# Patient Record
Sex: MALE | Race: WHITE | NOT HISPANIC OR LATINO | URBAN - METROPOLITAN AREA
[De-identification: names, ages, dates, MRNs, and addresses within clinical notes are randomized per-mention and may not be internally consistent; named-entity substitution may affect disease eponyms.]

---

## 2018-01-21 ENCOUNTER — EMERGENCY (EMERGENCY)
Facility: HOSPITAL | Age: 53
LOS: 1 days | Discharge: HOME | End: 2018-01-21
Attending: EMERGENCY MEDICINE | Admitting: EMERGENCY MEDICINE
Payer: MEDICAID

## 2018-01-21 VITALS
TEMPERATURE: 99 F | DIASTOLIC BLOOD PRESSURE: 78 MMHG | OXYGEN SATURATION: 100 % | RESPIRATION RATE: 16 BRPM | SYSTOLIC BLOOD PRESSURE: 143 MMHG | HEART RATE: 77 BPM

## 2018-01-21 LAB
APPEARANCE UR: CLEAR — SIGNIFICANT CHANGE UP
BILIRUB UR-MCNC: NEGATIVE — SIGNIFICANT CHANGE UP
COLOR SPEC: SIGNIFICANT CHANGE UP
DIFF PNL FLD: NEGATIVE — SIGNIFICANT CHANGE UP
GLUCOSE UR QL: NEGATIVE — SIGNIFICANT CHANGE UP
KETONES UR-MCNC: NEGATIVE — SIGNIFICANT CHANGE UP
LEUKOCYTE ESTERASE UR-ACNC: NEGATIVE — SIGNIFICANT CHANGE UP
NITRITE UR-MCNC: NEGATIVE — SIGNIFICANT CHANGE UP
PH UR: 6.5 — SIGNIFICANT CHANGE UP (ref 5–8)
PROT UR-MCNC: NEGATIVE — SIGNIFICANT CHANGE UP
SP GR SPEC: 1.02 — SIGNIFICANT CHANGE UP (ref 1.01–1.02)
UROBILINOGEN FLD QL: NEGATIVE — SIGNIFICANT CHANGE UP

## 2018-01-21 PROCEDURE — 87086 URINE CULTURE/COLONY COUNT: CPT

## 2018-01-21 PROCEDURE — 93975 VASCULAR STUDY: CPT | Mod: 26

## 2018-01-21 PROCEDURE — 76870 US EXAM SCROTUM: CPT | Mod: 26

## 2018-01-21 PROCEDURE — 99284 EMERGENCY DEPT VISIT MOD MDM: CPT | Mod: 25

## 2018-01-21 PROCEDURE — 99285 EMERGENCY DEPT VISIT HI MDM: CPT | Mod: 25

## 2018-01-21 PROCEDURE — 81003 URINALYSIS AUTO W/O SCOPE: CPT

## 2018-01-21 PROCEDURE — 93975 VASCULAR STUDY: CPT

## 2018-01-21 PROCEDURE — 76870 US EXAM SCROTUM: CPT

## 2018-01-21 RX ORDER — IBUPROFEN 200 MG
600 TABLET ORAL ONCE
Qty: 0 | Refills: 0 | Status: COMPLETED | OUTPATIENT
Start: 2018-01-21 | End: 2018-01-21

## 2018-01-21 RX ADMIN — Medication 600 MILLIGRAM(S): at 06:00

## 2018-01-21 NOTE — ED ADULT NURSE NOTE - OBJECTIVE STATEMENT
51 yo male complaining of pain on penis and scrotum, for 2 weeks. Pt alerted and oriented x3, no signs of acute distress noted at this moment. Vital sWNL

## 2018-01-21 NOTE — ED PROVIDER NOTE - OBJECTIVE STATEMENT
51yo M pmh hydrocele p/w b/l testicle pain x3wks. had penile pain x1day. had similiary symptoms 2years ago when he had hydrocele & epidydmitis. Denies fever, CP, SOB, Nausea, vomiting, diarrhea, abdominal pain, dysuria, penile dc, multiple sexual partners 51yo M pmh hydrocele p/w achy moderate intensity constant b/l testicle pain x3wks. had penile pain x1day. had similiary symptoms 2years ago when he had hydrocele & epidydmitis. Denies fever, CP, SOB, Nausea, vomiting, diarrhea, abdominal pain, dysuria, penile dc, multiple sexual partners. No exacerbating or relieving symptoms.

## 2018-01-21 NOTE — ED PROVIDER NOTE - MEDICAL DECISION MAKING DETAILS
impression: symptoms likely due to varicocle. will r/o epidymidis   plan: symptomatic tx w/ analgesic, - us -dc home w/ pmd for further w/u impression: symptoms likely due to varicocle. will r/o epidymidis, less likely torsion   plan: symptomatic tx w/ analgesic, - us -dc home w/ pmd for further w/u

## 2018-01-21 NOTE — ED ADULT TRIAGE NOTE - CHIEF COMPLAINT QUOTE
penile and testicular pain, denies any dc, Had the same problem a month ago and was resolved by itself.

## 2018-01-21 NOTE — ED PROVIDER NOTE - PROGRESS NOTE DETAILS
judy: PT seen and reassessed.  Patient symptomatically improved.   AAOX3, NAD, VSS.  Discussed test results w/ patient. Patient verbalized understanding of hospital course and outpatient plans, has decisional making capacity.  Will f/u w/ pmd in the next few days; patient will call for an appointment. Will return to the ED if there is any worsening of symptoms.  Patient able to ambulate at baseline, is tolerating PO intake

## 2018-01-21 NOTE — ED PROVIDER NOTE - ATTENDING CONTRIBUTION TO CARE
ATTENDING MD:  I, Enrico Hamilton, personally have seen and examined this patient.  I have discussed all aspects of care with the resident physician. Resident note reviewed and agree on plan of care and except where noted.  See HPI, PE, and MDM for details.     well appearing male. mildly uncomfortable. heart normal rate, regular rhythm. lungs clear to auscultation bilaterally, equal breath sounds bilaterally. abd soft, nontender nondistended. external genetalia unremarkable, circumcised penis, no discharge, no inguinal LA, no hernias, bilateral varicoceles, testicles nontender, no palpable epididymal vickey, +cremaster bilaterally, negative Prehn's sign bilaterally, no significant tenderness. no CVAT. no rash. skin exam normal for ethnicity.    MDM: chronic testicle pain. possible epididymitis, minimal concern for torsion. benign abdominal exam. will get US, give non-narcotic pain control, US for epididymitis. no obvious source for referred pain, no kidney stone symptoms. likely candiate for outpateint care w/urology f/u.

## 2018-01-22 LAB
C TRACH RRNA SPEC QL NAA+PROBE: SIGNIFICANT CHANGE UP
CULTURE RESULTS: NO GROWTH — SIGNIFICANT CHANGE UP
N GONORRHOEA RRNA SPEC QL NAA+PROBE: SIGNIFICANT CHANGE UP
SPECIMEN SOURCE: SIGNIFICANT CHANGE UP
SPECIMEN SOURCE: SIGNIFICANT CHANGE UP

## 2018-03-12 ENCOUNTER — APPOINTMENT (OUTPATIENT)
Dept: OPHTHALMOLOGY | Facility: CLINIC | Age: 53
End: 2018-03-12
Payer: MEDICAID

## 2018-03-12 PROCEDURE — 92004 COMPRE OPH EXAM NEW PT 1/>: CPT

## 2018-03-12 PROCEDURE — 92015 DETERMINE REFRACTIVE STATE: CPT

## 2018-09-15 ENCOUNTER — EMERGENCY (EMERGENCY)
Facility: HOSPITAL | Age: 53
LOS: 1 days | Discharge: HOME | End: 2018-09-15
Attending: EMERGENCY MEDICINE
Payer: MEDICAID

## 2018-09-15 VITALS
DIASTOLIC BLOOD PRESSURE: 80 MMHG | RESPIRATION RATE: 18 BRPM | HEART RATE: 83 BPM | OXYGEN SATURATION: 98 % | WEIGHT: 145.06 LBS | SYSTOLIC BLOOD PRESSURE: 137 MMHG | HEIGHT: 68 IN

## 2018-09-15 VITALS
HEART RATE: 78 BPM | DIASTOLIC BLOOD PRESSURE: 90 MMHG | TEMPERATURE: 99 F | SYSTOLIC BLOOD PRESSURE: 122 MMHG | RESPIRATION RATE: 16 BRPM | OXYGEN SATURATION: 97 %

## 2018-09-15 LAB
ALBUMIN SERPL ELPH-MCNC: 4.5 G/DL — SIGNIFICANT CHANGE UP (ref 3.3–5)
ALP SERPL-CCNC: 74 U/L — SIGNIFICANT CHANGE UP (ref 40–120)
ALT FLD-CCNC: 21 U/L — SIGNIFICANT CHANGE UP (ref 10–45)
ANION GAP SERPL CALC-SCNC: 10 MMOL/L — SIGNIFICANT CHANGE UP (ref 5–17)
AST SERPL-CCNC: 21 U/L — SIGNIFICANT CHANGE UP (ref 10–40)
BASOPHILS # BLD AUTO: 0.1 K/UL — SIGNIFICANT CHANGE UP (ref 0–0.2)
BASOPHILS NFR BLD AUTO: 1.3 % — SIGNIFICANT CHANGE UP (ref 0–2)
BILIRUB SERPL-MCNC: 0.4 MG/DL — SIGNIFICANT CHANGE UP (ref 0.2–1.2)
BUN SERPL-MCNC: 16 MG/DL — SIGNIFICANT CHANGE UP (ref 7–23)
CALCIUM SERPL-MCNC: 9.6 MG/DL — SIGNIFICANT CHANGE UP (ref 8.4–10.5)
CHLORIDE SERPL-SCNC: 101 MMOL/L — SIGNIFICANT CHANGE UP (ref 96–108)
CO2 SERPL-SCNC: 25 MMOL/L — SIGNIFICANT CHANGE UP (ref 22–31)
CREAT SERPL-MCNC: 1.12 MG/DL — SIGNIFICANT CHANGE UP (ref 0.5–1.3)
EOSINOPHIL # BLD AUTO: 0.3 K/UL — SIGNIFICANT CHANGE UP (ref 0–0.5)
EOSINOPHIL NFR BLD AUTO: 3.3 % — SIGNIFICANT CHANGE UP (ref 0–6)
GLUCOSE SERPL-MCNC: 102 MG/DL — HIGH (ref 70–99)
HCT VFR BLD CALC: 44.2 % — SIGNIFICANT CHANGE UP (ref 39–50)
HGB BLD-MCNC: 15 G/DL — SIGNIFICANT CHANGE UP (ref 13–17)
LYMPHOCYTES # BLD AUTO: 2.6 K/UL — SIGNIFICANT CHANGE UP (ref 1–3.3)
LYMPHOCYTES # BLD AUTO: 33.9 % — SIGNIFICANT CHANGE UP (ref 13–44)
MCHC RBC-ENTMCNC: 29.3 PG — SIGNIFICANT CHANGE UP (ref 27–34)
MCHC RBC-ENTMCNC: 34 GM/DL — SIGNIFICANT CHANGE UP (ref 32–36)
MCV RBC AUTO: 86.1 FL — SIGNIFICANT CHANGE UP (ref 80–100)
MONOCYTES # BLD AUTO: 0.6 K/UL — SIGNIFICANT CHANGE UP (ref 0–0.9)
MONOCYTES NFR BLD AUTO: 7.6 % — SIGNIFICANT CHANGE UP (ref 2–14)
NEUTROPHILS # BLD AUTO: 4.1 K/UL — SIGNIFICANT CHANGE UP (ref 1.8–7.4)
NEUTROPHILS NFR BLD AUTO: 53.9 % — SIGNIFICANT CHANGE UP (ref 43–77)
PLATELET # BLD AUTO: 259 K/UL — SIGNIFICANT CHANGE UP (ref 150–400)
POTASSIUM SERPL-MCNC: 4.3 MMOL/L — SIGNIFICANT CHANGE UP (ref 3.5–5.3)
POTASSIUM SERPL-SCNC: 4.3 MMOL/L — SIGNIFICANT CHANGE UP (ref 3.5–5.3)
PROT SERPL-MCNC: 7.3 G/DL — SIGNIFICANT CHANGE UP (ref 6–8.3)
RBC # BLD: 5.13 M/UL — SIGNIFICANT CHANGE UP (ref 4.2–5.8)
RBC # FLD: 12.8 % — SIGNIFICANT CHANGE UP (ref 10.3–14.5)
SODIUM SERPL-SCNC: 136 MMOL/L — SIGNIFICANT CHANGE UP (ref 135–145)
TROPONIN T, HIGH SENSITIVITY RESULT: <6 NG/L — SIGNIFICANT CHANGE UP (ref 0–51)
WBC # BLD: 7.6 K/UL — SIGNIFICANT CHANGE UP (ref 3.8–10.5)
WBC # FLD AUTO: 7.6 K/UL — SIGNIFICANT CHANGE UP (ref 3.8–10.5)

## 2018-09-15 PROCEDURE — 93005 ELECTROCARDIOGRAM TRACING: CPT

## 2018-09-15 PROCEDURE — 85027 COMPLETE CBC AUTOMATED: CPT

## 2018-09-15 PROCEDURE — 93010 ELECTROCARDIOGRAM REPORT: CPT

## 2018-09-15 PROCEDURE — 80053 COMPREHEN METABOLIC PANEL: CPT

## 2018-09-15 PROCEDURE — 99283 EMERGENCY DEPT VISIT LOW MDM: CPT

## 2018-09-15 PROCEDURE — 84484 ASSAY OF TROPONIN QUANT: CPT

## 2018-09-15 PROCEDURE — 99284 EMERGENCY DEPT VISIT MOD MDM: CPT | Mod: 25

## 2018-09-15 RX ORDER — CARBAMIDE PEROXIDE 81.86 MG/ML
4 SOLUTION/ DROPS AURICULAR (OTIC)
Qty: 0 | Refills: 0 | Status: DISCONTINUED | OUTPATIENT
Start: 2018-09-15 | End: 2018-09-19

## 2018-09-15 RX ORDER — MECLIZINE HCL 12.5 MG
25 TABLET ORAL ONCE
Qty: 0 | Refills: 0 | Status: COMPLETED | OUTPATIENT
Start: 2018-09-15 | End: 2018-09-15

## 2018-09-15 RX ORDER — MECLIZINE HCL 12.5 MG
1 TABLET ORAL
Qty: 21 | Refills: 0 | OUTPATIENT
Start: 2018-09-15 | End: 2018-09-21

## 2018-09-15 RX ADMIN — CARBAMIDE PEROXIDE 4 DROP(S): 81.86 SOLUTION/ DROPS AURICULAR (OTIC) at 13:54

## 2018-09-15 RX ADMIN — Medication 25 MILLIGRAM(S): at 11:38

## 2018-09-15 NOTE — ED PROVIDER NOTE - ATTENDING CONTRIBUTION TO CARE
54 yo male with multiple episode of vertiginous feeling over the past 3-4 weeks.  Last night also had some transient chest pressure.  Well appearing on my exam.  Will medicate, check labs/HST, reassess.  Very low suspicion for cardiac etiology, not CVA, suspect BPV.

## 2018-09-15 NOTE — ED ADULT TRIAGE NOTE - CHIEF COMPLAINT QUOTE
pt c/o dizziness, palpitations, sob, generalized weakness for 4 weeks pt c/o dizziness, palpitations, sob, generalized weakness for 4 weeks, pt went on vacation even though he wasn't feeling sick

## 2018-09-15 NOTE — ED ADULT NURSE NOTE - OBJECTIVE STATEMENT
53v yrs old male with no medical history present to the ER for dizziness, chest palpitations and SOB x3 months. As per pt he did not seek medical treatment for symptoms. Pt report that he is concern because he is planning on travelling outside the country. Pt report the symptoms are constant and that movements make it worse.

## 2018-09-15 NOTE — ED PROVIDER NOTE - PHYSICAL EXAMINATION
GENERAL: no acute distress; well-developed  HEAD:  Atraumatic, Normocephalic  EYES: EOMI PERRLA, conjunctiva and sclera clear. No nystagmus.   ENT: MMM; oropharynx clear  NECK: Supple, No JVD  CHEST/LUNG: Clear to auscultation bilaterally; No wheeze  HEART: Regular rate and rhythm; No murmurs, rubs, or gallops  ABDOMEN: Soft, Nontender, Nondistended; Bowel sounds present  EXTREMITIES:  2+ Peripheral Pulses, No clubbing, cyanosis, or edema  PSYCH: AAOx3  NEUROLOGY: no focal motor or sensory deficits. 5/5 muscle strength in all extremities. Negative HINTS. Normal finger to nose. Negative pronator drift.   SKIN: No rashes or lesions GENERAL: no acute distress; well-developed  HEAD:  Atraumatic, Normocephalic  EYES: EOMI PERRLA, conjunctiva and sclera clear. No nystagmus.   ENT: MMM; oropharynx clear; cerumen impaction b/l   NECK: Supple, No JVD  CHEST/LUNG: Clear to auscultation bilaterally; No wheeze  HEART: Regular rate and rhythm; No murmurs, rubs, or gallops  ABDOMEN: Soft, Nontender, Nondistended; Bowel sounds present  EXTREMITIES:  2+ Peripheral Pulses, No clubbing, cyanosis, or edema  PSYCH: AAOx3  NEUROLOGY: no focal motor or sensory deficits. 5/5 muscle strength in all extremities. Negative HINTS. Normal finger to nose. Negative pronator drift.   SKIN: No rashes or lesions

## 2018-09-15 NOTE — ED ADULT NURSE NOTE - NSIMPLEMENTINTERV_GEN_ALL_ED
Implemented All Universal Safety Interventions:  [place] to call system. Call bell, personal items and telephone within reach. Instruct patient to call for assistance. Room bathroom lighting operational. Non-slip footwear when patient is off stretcher. Physically safe environment: no spills, clutter or unnecessary equipment. Stretcher in lowest position, wheels locked, appropriate side rails in place.

## 2018-09-15 NOTE — ED PROVIDER NOTE - CARE PLAN
Principal Discharge DX:	Vertigo  Assessment and plan of treatment:	1. Take meclizine as prescribed  2. Follow up with your primary care doctor within 1 week  3. Return to the emergency room for any worsening chest pain or difficulty breathing.   4. Use Debrox drops as directed

## 2018-09-15 NOTE — ED PROVIDER NOTE - PLAN OF CARE
1. Take meclizine as prescribed  2. Follow up with your primary care doctor within 1 week  3. Return to the emergency room for any worsening chest pain or difficulty breathing.   4. Use Debrox drops as directed

## 2018-09-15 NOTE — ED ADULT NURSE NOTE - CHPI ED NUR SYMPTOMS NEG
no tingling/no chills/no nausea/no fever/no vomiting/no decreased eating/drinking/no weakness/no pain

## 2018-09-15 NOTE — ED PROVIDER NOTE - PROGRESS NOTE DETAILS
Vertigo Improved; patient not experiencing any chest pain. Will discharge with meclizine & PMD follow up.

## 2018-09-15 NOTE — ED PROVIDER NOTE - OBJECTIVE STATEMENT
52 y/o M no pertinent PMH presents with dizziness & nausea x 3-4 weeks.     Patient states last night did not feel well and thus came in today. Has been experiencing dizziness regardless of position; describes as spinning sensation with associated nausea. Denies any ringing in ears or hearing loss but notes fullness in ears. Denies any URI Sx prior to sx. Notes dizziness fluctuates in intensity but rarely subsides completely.     PMD: Dr. Finch 52 y/o M no pertinent PMH presents with dizziness & nausea x 3-4 weeks.     Patient states last night did not feel well and thus came in today. Has been experiencing dizziness regardless of position; describes as spinning sensation with associated nausea. Denies any ringing in ears or hearing loss but notes fullness in ears. Denies any URI Sx prior to sx. Notes dizziness fluctuates in intensity but rarely subsides completely. Noticed chest pressure last night.     PMD: Dr. Finch

## 2018-09-15 NOTE — ED PROVIDER NOTE - MEDICAL DECISION MAKING DETAILS
54 y/o M no significant PMH presents with vertigo. Exam c/w peripheral vertigo (BBPV v. labyrinthitis); low suspicion of central cause. given chronicity and positional nature of sx. Given palpitations & chest pressure will obtain EKG & cardiac enzymes.

## 2018-09-15 NOTE — ED ADULT NURSE NOTE - CHIEF COMPLAINT QUOTE
pt c/o dizziness, palpitations, sob, generalized weakness for 4 weeks, pt went on vacation even though he wasn't feeling sick

## 2019-11-22 ENCOUNTER — EMERGENCY (EMERGENCY)
Facility: HOSPITAL | Age: 54
LOS: 1 days | Discharge: HOME | End: 2019-11-22
Attending: EMERGENCY MEDICINE | Admitting: EMERGENCY MEDICINE
Payer: COMMERCIAL

## 2019-11-22 VITALS
SYSTOLIC BLOOD PRESSURE: 138 MMHG | RESPIRATION RATE: 18 BRPM | DIASTOLIC BLOOD PRESSURE: 67 MMHG | HEART RATE: 95 BPM | TEMPERATURE: 98 F | OXYGEN SATURATION: 100 %

## 2019-11-22 VITALS
OXYGEN SATURATION: 99 % | DIASTOLIC BLOOD PRESSURE: 70 MMHG | HEART RATE: 73 BPM | RESPIRATION RATE: 16 BRPM | SYSTOLIC BLOOD PRESSURE: 108 MMHG | TEMPERATURE: 98 F

## 2019-11-22 LAB
ALBUMIN SERPL ELPH-MCNC: 4.4 G/DL — SIGNIFICANT CHANGE UP (ref 3.3–5)
ALP SERPL-CCNC: 68 U/L — SIGNIFICANT CHANGE UP (ref 40–120)
ALT FLD-CCNC: 27 U/L — SIGNIFICANT CHANGE UP (ref 10–45)
ANION GAP SERPL CALC-SCNC: 12 MMOL/L — SIGNIFICANT CHANGE UP (ref 5–17)
APPEARANCE UR: CLEAR — SIGNIFICANT CHANGE UP
AST SERPL-CCNC: 21 U/L — SIGNIFICANT CHANGE UP (ref 10–40)
BACTERIA # UR AUTO: PRESENT /HPF
BASOPHILS # BLD AUTO: 0.08 K/UL — SIGNIFICANT CHANGE UP (ref 0–0.2)
BASOPHILS NFR BLD AUTO: 0.9 % — SIGNIFICANT CHANGE UP (ref 0–2)
BILIRUB SERPL-MCNC: 0.3 MG/DL — SIGNIFICANT CHANGE UP (ref 0.2–1.2)
BILIRUB UR-MCNC: NEGATIVE — SIGNIFICANT CHANGE UP
BUN SERPL-MCNC: 14 MG/DL — SIGNIFICANT CHANGE UP (ref 7–23)
CALCIUM SERPL-MCNC: 9.3 MG/DL — SIGNIFICANT CHANGE UP (ref 8.4–10.5)
CHLORIDE SERPL-SCNC: 104 MMOL/L — SIGNIFICANT CHANGE UP (ref 96–108)
CO2 SERPL-SCNC: 25 MMOL/L — SIGNIFICANT CHANGE UP (ref 22–31)
COLOR SPEC: YELLOW — SIGNIFICANT CHANGE UP
CREAT SERPL-MCNC: 0.98 MG/DL — SIGNIFICANT CHANGE UP (ref 0.5–1.3)
DIFF PNL FLD: NEGATIVE — SIGNIFICANT CHANGE UP
EOSINOPHIL # BLD AUTO: 0.24 K/UL — SIGNIFICANT CHANGE UP (ref 0–0.5)
EOSINOPHIL NFR BLD AUTO: 2.8 % — SIGNIFICANT CHANGE UP (ref 0–6)
EPI CELLS # UR: SIGNIFICANT CHANGE UP /HPF (ref 0–5)
GLUCOSE SERPL-MCNC: 111 MG/DL — HIGH (ref 70–99)
GLUCOSE UR QL: NEGATIVE — SIGNIFICANT CHANGE UP
HCT VFR BLD CALC: 44.3 % — SIGNIFICANT CHANGE UP (ref 39–50)
HGB BLD-MCNC: 14.6 G/DL — SIGNIFICANT CHANGE UP (ref 13–17)
IMM GRANULOCYTES NFR BLD AUTO: 0.3 % — SIGNIFICANT CHANGE UP (ref 0–1.5)
KETONES UR-MCNC: ABNORMAL MG/DL
LEUKOCYTE ESTERASE UR-ACNC: NEGATIVE — SIGNIFICANT CHANGE UP
LYMPHOCYTES # BLD AUTO: 2.65 K/UL — SIGNIFICANT CHANGE UP (ref 1–3.3)
LYMPHOCYTES # BLD AUTO: 30.6 % — SIGNIFICANT CHANGE UP (ref 13–44)
MCHC RBC-ENTMCNC: 28.1 PG — SIGNIFICANT CHANGE UP (ref 27–34)
MCHC RBC-ENTMCNC: 33 GM/DL — SIGNIFICANT CHANGE UP (ref 32–36)
MCV RBC AUTO: 85.4 FL — SIGNIFICANT CHANGE UP (ref 80–100)
MONOCYTES # BLD AUTO: 0.74 K/UL — SIGNIFICANT CHANGE UP (ref 0–0.9)
MONOCYTES NFR BLD AUTO: 8.5 % — SIGNIFICANT CHANGE UP (ref 2–14)
NEUTROPHILS # BLD AUTO: 4.92 K/UL — SIGNIFICANT CHANGE UP (ref 1.8–7.4)
NEUTROPHILS NFR BLD AUTO: 56.9 % — SIGNIFICANT CHANGE UP (ref 43–77)
NITRITE UR-MCNC: NEGATIVE — SIGNIFICANT CHANGE UP
NRBC # BLD: 0 /100 WBCS — SIGNIFICANT CHANGE UP (ref 0–0)
PH UR: 8 — SIGNIFICANT CHANGE UP (ref 5–8)
PLATELET # BLD AUTO: 288 K/UL — SIGNIFICANT CHANGE UP (ref 150–400)
POTASSIUM SERPL-MCNC: 3.9 MMOL/L — SIGNIFICANT CHANGE UP (ref 3.5–5.3)
POTASSIUM SERPL-SCNC: 3.9 MMOL/L — SIGNIFICANT CHANGE UP (ref 3.5–5.3)
PROT SERPL-MCNC: 7.4 G/DL — SIGNIFICANT CHANGE UP (ref 6–8.3)
PROT UR-MCNC: ABNORMAL MG/DL
RBC # BLD: 5.19 M/UL — SIGNIFICANT CHANGE UP (ref 4.2–5.8)
RBC # FLD: 13.2 % — SIGNIFICANT CHANGE UP (ref 10.3–14.5)
RBC CASTS # UR COMP ASSIST: < 5 /HPF — SIGNIFICANT CHANGE UP
SODIUM SERPL-SCNC: 141 MMOL/L — SIGNIFICANT CHANGE UP (ref 135–145)
SP GR SPEC: 1.02 — SIGNIFICANT CHANGE UP (ref 1–1.03)
TROPONIN T SERPL-MCNC: <0.01 NG/ML — SIGNIFICANT CHANGE UP (ref 0–0.01)
UROBILINOGEN FLD QL: 0.2 E.U./DL — SIGNIFICANT CHANGE UP
WBC # BLD: 8.66 K/UL — SIGNIFICANT CHANGE UP (ref 3.8–10.5)
WBC # FLD AUTO: 8.66 K/UL — SIGNIFICANT CHANGE UP (ref 3.8–10.5)
WBC UR QL: < 5 /HPF — SIGNIFICANT CHANGE UP

## 2019-11-22 PROCEDURE — 84484 ASSAY OF TROPONIN QUANT: CPT

## 2019-11-22 PROCEDURE — 99284 EMERGENCY DEPT VISIT MOD MDM: CPT | Mod: 25

## 2019-11-22 PROCEDURE — 93010 ELECTROCARDIOGRAM REPORT: CPT

## 2019-11-22 PROCEDURE — 85025 COMPLETE CBC W/AUTO DIFF WBC: CPT

## 2019-11-22 PROCEDURE — 93005 ELECTROCARDIOGRAM TRACING: CPT

## 2019-11-22 PROCEDURE — 80053 COMPREHEN METABOLIC PANEL: CPT

## 2019-11-22 PROCEDURE — 71046 X-RAY EXAM CHEST 2 VIEWS: CPT | Mod: 26

## 2019-11-22 PROCEDURE — 81001 URINALYSIS AUTO W/SCOPE: CPT

## 2019-11-22 PROCEDURE — 99285 EMERGENCY DEPT VISIT HI MDM: CPT

## 2019-11-22 PROCEDURE — 70450 CT HEAD/BRAIN W/O DYE: CPT

## 2019-11-22 PROCEDURE — 71046 X-RAY EXAM CHEST 2 VIEWS: CPT

## 2019-11-22 PROCEDURE — 36415 COLL VENOUS BLD VENIPUNCTURE: CPT

## 2019-11-22 PROCEDURE — 70450 CT HEAD/BRAIN W/O DYE: CPT | Mod: 26

## 2019-11-22 RX ORDER — SODIUM CHLORIDE 9 MG/ML
1000 INJECTION INTRAMUSCULAR; INTRAVENOUS; SUBCUTANEOUS ONCE
Refills: 0 | Status: COMPLETED | OUTPATIENT
Start: 2019-11-22 | End: 2019-11-22

## 2019-11-22 RX ADMIN — SODIUM CHLORIDE 1000 MILLILITER(S): 9 INJECTION INTRAMUSCULAR; INTRAVENOUS; SUBCUTANEOUS at 10:08

## 2019-11-22 NOTE — ED PROVIDER NOTE - OBJECTIVE STATEMENT
55 y/o male with no sig PMHx c/o near syncope. pt states [place] the train and felt like he was going to pass out. pt notes became dizzy with chest tightness followed by tingling all over. similar symptoms over past 2 months. pt denies LOC. no ha or visual changes. no weakness. no sob, lloyd, abd pain, n/v. no further complaints.

## 2019-11-22 NOTE — ED ADULT TRIAGE NOTE - OTHER COMPLAINTS
pt reports sitting on the train, sudden onset numbness all over, difficulty catching his breath. denies pmhx. pt admits to having several similar episodes that have subsided on their own over the last few weeks. pt appears anxious

## 2019-11-22 NOTE — ED PROVIDER NOTE - ATTENDING CONTRIBUTION TO CARE
54M no PMH p/w several complaints[place] feeling generalized weakness, followed by head spinning, heart beating fast, tingling all over body, feeling cold/shivering, breathing fast. Symptoms lasted ~1hr and now completely resolved. Has similar episodes ~once a week for several mos. First time getting evaluated. Pt was feeling like usual self prior to events and feels like usual self now. Denies diaphoresis, NVD, abd pain, URI symptoms, urinary complaints, black/bloody stool, focal weakness/numbness, recent travel/immobilization. No actual LOC. No FMH CAD/clots/sudden death. Vitals wnl. Exam as above.   In ED, received IVF. Labs, CTH, CXR all grossly wnl. Remained asymptoamtic.   Unclear etiology. clinically not ACS, PE, tamponade, dissection, PTX, perf, myocarditis, mediastinitis, CVA. Clinically no indication for further emergent ED workup or hospitalization at this time. discussed importance of outpt f/u. Comfortable for dc.

## 2019-11-22 NOTE — ED PROVIDER NOTE - PATIENT PORTAL LINK FT
You can access the FollowMyHealth Patient Portal offered by [facility] by registering at the following website: [email or web address]. By joining [employer]’s FollowMyHealth portal, you will also be able to view your health information using other applications (apps) compatible with our system.

## 2019-11-22 NOTE — ED PROVIDER NOTE - CLINICAL SUMMARY MEDICAL DECISION MAKING FREE TEXT BOX
dizziness, chest tightness, tingling, and near syncope. pt well appearing. neuro exam intact. VSS. sx's resolved in ED by fluid. ecg no ischemic changes, ct head no acute pathology, cxr no acute pathology. labs noted. troponin negative. will d/c home to f/u with cardiology. return precautions d/w pt.

## 2019-11-22 NOTE — ED PROVIDER NOTE - CARE PROVIDER_API CALL
Kumar Fragoso)  Cardiovascular Disease; Internal Medicine  [address] [id number]  Phone: [phone]  Fax: [phone]  Follow Up Time: 7-10 Days

## 2019-11-22 NOTE — ED ADULT NURSE NOTE - OBJECTIVE STATEMENT
Pt is a 53 y/o male c/o intermittent episodes of chest tightness, dizziness, SOB, nausea and "tingling all over" x 2 months. Pt reports feeling increase stress and anxiety over last few months. Upon assessment pt anxious but cooperative. LCTA. Pt denies leg swelling/pain, chest pain, any medical hx. Pt is a 53 y/o male c/o intermittent episodes of chest tightness, dizziness, SOB, nausea and "tingling all over" x 2 months. Pt reports feeling increase stress and anxiety over last few months. Upon assessment pt anxious but cooperative. LCTA. Pt denies leg swelling/pain, chest pain, any medical hx, SI/HI.

## 2019-11-22 NOTE — ED PROVIDER NOTE - NSFOLLOWUPINSTRUCTIONS_ED_ALL_ED_FT
Follow up with cardiology in one week and follow up with your primary care physician within one week.        Near Syncope    WHAT YOU NEED TO KNOW:    Near syncope, also called presyncope, is the feeling that you may faint (lose consciousness), but you do not. You can control some health conditions that cause near syncope. Your healthcare providers can help you create a plan to manage near syncope and prevent episodes.    DISCHARGE INSTRUCTIONS:    Return to the emergency department if:     You have sudden chest pain.       You have trouble breathing or shortness of breath.       You have vision changes, are sweating, and have nausea while you are sitting or lying down.       You feel dizzy or flushed and your heart is fluttering.      You lose consciousness.      You cannot use your arm, hand, foot, or leg, or it feels weak.      You have trouble speaking or understanding others when they speak.    Contact your healthcare provider if:     You have new or worsening symptoms.      Your heart beats faster or slower than usual.       You have questions or concerns about your condition or care.    Medicines:     Medicines may be needed to help your heart pump strongly and regularly. Your healthcare provider may also make changes to any medicines that are causing syncope.       Take your medicine as directed. Contact your healthcare provider if you think your medicine is not helping or if you have side effects. Tell him or her if you are allergic to any medicine. Keep a list of the medicines, vitamins, and herbs you take. Include the amounts, and when and why you take them. Bring the list or the pill bottles to follow-up visits. Carry your medicine list with you in case of an emergency.    Follow up with your healthcare provider as directed: You may need more tests to help find the cause of your near syncope episodes. The tests will help healthcare providers plan the best treatment for you. Write down your questions so you remember to ask them during your visits.     Manage near syncope:     Sit or lie down when needed. This includes when you feel dizzy, your throat is getting tight, and your vision changes. Raise your legs above the level of your heart.      Take slow, deep breaths if you start to breathe faster with anxiety or fear. This can help decrease dizziness and the feeling that you might faint.       Keep a record of your near syncope episodes. Include your symptoms and your activity before and after the episode. The record can help your healthcare provider find the cause of your near syncope and help you manage episodes.    Prevent a near syncope episode:     Move slowly and let yourself get used to one position before you move to another position. This is very important when you change from a lying or sitting position to a standing position. Take some deep breaths before you stand up from a lying position. Stand up slowly. Sudden movements may cause a fainting spell. Sit on the side of the bed or couch for a few minutes before you stand up. If you are on bedrest, try to be upright for about 2 hours each day, or as directed. Do not lock your legs if you are standing for a long period of time. Move your legs and bend your knees to keep blood flowing.      Follow your healthcare provider's recommendations. Your provider may recommend that you drink more liquids to prevent dehydration. You may also need to have more salt to keep your blood pressure from dropping too low and causing syncope. Your provider will tell you how much liquid and sodium to have each day. He or she will also tell you how much physical activity is safe for you. This will depend on what is causing your near syncope.      Watch for signs of low blood sugar. These include hunger, nervousness, sweating, and fast or fluttery heartbeats. Talk with your healthcare provider about ways to keep your blood sugar level steady.      Check your blood pressure often. This is important if you take medicine to lower your blood pressure. Check your blood pressure when you are lying down and when you are standing. Ask how often to check during the day. Keep a record of your blood pressure numbers. Your healthcare provider may use the record to help plan your treatment.How to take a Blood Pressure           Do not strain if you are constipated. You may faint if you strain to have a bowel movement. Walking is the best way to get your bowels moving. Eat foods high in fiber to make it easier to have a bowel movement. Good examples are high-fiber cereals, beans, vegetables, and whole-grain breads. Prune juice may help make bowel movements softer.      Do not exercise outside on a hot day. The combination of physical activity and heat can lead to dehydration. This can cause syncope.         © Copyright [employer] 2019       back to top                      © Copyright [employer] 2019

## 2019-11-25 ENCOUNTER — APPOINTMENT (OUTPATIENT)
Dept: HEART AND VASCULAR | Facility: CLINIC | Age: 54
End: 2019-11-25
Payer: MEDICAID

## 2019-11-25 VITALS
WEIGHT: 148.99 LBS | OXYGEN SATURATION: 99 % | SYSTOLIC BLOOD PRESSURE: 114 MMHG | BODY MASS INDEX: 23.94 KG/M2 | TEMPERATURE: 97.7 F | HEIGHT: 66 IN | HEART RATE: 80 BPM | DIASTOLIC BLOOD PRESSURE: 70 MMHG

## 2019-11-25 DIAGNOSIS — Z82.49 FAMILY HISTORY OF ISCHEMIC HEART DISEASE AND OTHER DISEASES OF THE CIRCULATORY SYSTEM: ICD-10-CM

## 2019-11-25 DIAGNOSIS — Z78.9 OTHER SPECIFIED HEALTH STATUS: ICD-10-CM

## 2019-11-25 PROCEDURE — 99204 OFFICE O/P NEW MOD 45 MIN: CPT

## 2019-11-25 PROCEDURE — 93306 TTE W/DOPPLER COMPLETE: CPT

## 2019-11-25 PROCEDURE — 93000 ELECTROCARDIOGRAM COMPLETE: CPT

## 2019-11-26 ENCOUNTER — APPOINTMENT (OUTPATIENT)
Dept: INTERNAL MEDICINE | Facility: CLINIC | Age: 54
End: 2019-11-26
Payer: MEDICAID

## 2019-11-26 VITALS
HEART RATE: 73 BPM | OXYGEN SATURATION: 100 % | SYSTOLIC BLOOD PRESSURE: 113 MMHG | WEIGHT: 145 LBS | BODY MASS INDEX: 23.3 KG/M2 | TEMPERATURE: 97.2 F | HEIGHT: 66 IN | DIASTOLIC BLOOD PRESSURE: 71 MMHG

## 2019-11-26 VITALS — SYSTOLIC BLOOD PRESSURE: 110 MMHG | DIASTOLIC BLOOD PRESSURE: 68 MMHG

## 2019-11-26 VITALS — SYSTOLIC BLOOD PRESSURE: 118 MMHG | DIASTOLIC BLOOD PRESSURE: 70 MMHG

## 2019-11-26 DIAGNOSIS — R55 SYNCOPE AND COLLAPSE: ICD-10-CM

## 2019-11-26 DIAGNOSIS — Z87.19 PERSONAL HISTORY OF OTHER DISEASES OF THE DIGESTIVE SYSTEM: ICD-10-CM

## 2019-11-26 DIAGNOSIS — R07.89 OTHER CHEST PAIN: ICD-10-CM

## 2019-11-26 DIAGNOSIS — R20.2 PARESTHESIA OF SKIN: ICD-10-CM

## 2019-11-26 DIAGNOSIS — Z00.01 ENCOUNTER FOR GENERAL ADULT MEDICAL EXAMINATION WITH ABNORMAL FINDINGS: ICD-10-CM

## 2019-11-26 DIAGNOSIS — R42 DIZZINESS AND GIDDINESS: ICD-10-CM

## 2019-11-26 DIAGNOSIS — Z83.49 FAMILY HISTORY OF OTHER ENDOCRINE, NUTRITIONAL AND METABOLIC DISEASES: ICD-10-CM

## 2019-11-26 LAB — HIV1+2 AB SPEC QL IA.RAPID: NORMAL

## 2019-11-26 PROCEDURE — 99386 PREV VISIT NEW AGE 40-64: CPT | Mod: 25

## 2019-11-26 PROCEDURE — 99204 OFFICE O/P NEW MOD 45 MIN: CPT | Mod: GC,25

## 2019-11-26 PROCEDURE — 36415 COLL VENOUS BLD VENIPUNCTURE: CPT

## 2019-11-27 LAB
CHOLEST SERPL-MCNC: 184 MG/DL
CHOLEST/HDLC SERPL: 3.5 RATIO
HDLC SERPL-MCNC: 53 MG/DL
LDLC SERPL CALC-MCNC: 116 MG/DL
TRIGL SERPL-MCNC: 74 MG/DL
TSH SERPL-ACNC: 1.33 UIU/ML

## 2019-11-28 NOTE — DISCUSSION/SUMMARY
[FreeTextEntry1] : The number of diagnostic and/or management options include:\par \par Syncope  - Evaluated adrenergic innervation of the circulation and of the heart in autonomic failure. The following tests were performed: [id number] blood pressure and R-R interval response to Valsalva maneuver, sustained hand , and blood pressure and heart rate responses to tilt-up or active standing.\par will place 14d monitor and obtian 2d echo\par medicine and neuro consutlations\par \par CAD - the patient is not in active chest pain, no new sob or lloyd, ekg is unchanged and stable. on guideline directed medical therapy.\par \par HTN - the patient BP is at goal, ambi bp monitor at goal, no jvd/rale/edema on exam, continue current medicines\par \par CV Prevention - \par ·	Advised SBP guidelines based on ACC/AHA and SPRINT trial increased CAD PAD and mortality \par ·	Assessed willingness to attempt - contemplation stage\par ·	Agree to no added salt and added sugar diet  - prevention medicine referral, nutritionist\par ·	Assist with resources provided - prevention medicine referral, nutritionist, individual counseling\par ·	Arrange ·	Follow-up arranged - next scheduled visit\par \par Prescription drug management: no change\par Review of medicine test: EKG\par Independent review of images: CXR, CT Scan, Stress tests, as applicable\par Review of clinical lab tests: lipid profile, hgA1c, cbc, bnp, metabolic panels, as applicable\par \par \par \par

## 2019-11-28 NOTE — HISTORY OF PRESENT ILLNESS
[FreeTextEntry1] : 55 y/o male with no sig PMHx c/o near syncope. pt states he was sitting on the train and felt like he was going to pass out. pt notes became dizzy with chest tightness followed by tingling all over. similar symptoms over past 2 months. pt denies LOC. no ha or visual changes. no weakness. no sob, lloyd, abd pain, n/v. no further complaints.\par symptoms lasted ~1hr and now completely resolved. Has similar episodes ~once a week for several mos. First time getting evaluated. Pt was feeling like usual self prior to events and feels like usual self now. Denies diaphoresis, NVD, abd pain, URI symptoms, urinary complaints, black/bloody stool, focal weakness/numbness, recent travel/immobilization. No actual LOC. No FMH CAD/clots/sudden death.\par ECG: · Interpretation: normal sinus rhythm\par · Axis: Normal\par · W[place]: 152ms\par · QRS: 90ms\par · ST/T Wave: no st-twave changes\par

## 2019-11-28 NOTE — PHYSICAL EXAM
[General Appearance - Well Developed] : well developed [Normal Appearance] : normal appearance [Well Groomed] : well groomed [No Deformities] : no deformities [General Appearance - In No Acute Distress] : no acute distress [General Appearance - Well Nourished] : well nourished [Normal Conjunctiva] : the conjunctiva exhibited no abnormalities [Eyelids - No Xanthelasma] : the eyelids demonstrated no xanthelasmas [Normal Oral Mucosa] : normal oral mucosa [No Oral Pallor] : no oral pallor [No Oral Cyanosis] : no oral cyanosis [Normal Jugular Venous V Waves Present] : normal jugular venous V waves present [Normal Jugular Venous A Waves Present] : normal jugular venous A waves present [No Jugular Venous Waldrop A Waves] : no jugular venous waldrop A waves [Heart Sounds] : normal S1 and S2 [Heart Rate And Rhythm] : heart rate and rhythm were normal [Murmurs] : no murmurs present [Respiration, Rhythm And Depth] : normal respiratory rhythm and effort [Exaggerated Use Of Accessory Muscles For Inspiration] : no accessory muscle use [Auscultation Breath Sounds / Voice Sounds] : lungs were clear to auscultation bilaterally [Abdomen Tenderness] : non-tender [Abdomen Soft] : soft [Gait - Sufficient For Exercise Testing] : the gait was sufficient for exercise testing [Abnormal Walk] : normal gait [Abdomen Mass (___ Cm)] : no abdominal mass palpated [Nail Clubbing] : no clubbing of the fingernails [Petechial Hemorrhages (___cm)] : no petechial hemorrhages [Cyanosis, Localized] : no localized cyanosis [] : no rash [Skin Color & Pigmentation] : normal skin color and pigmentation [No Skin Ulcers] : no skin ulcer [Skin Lesions] : no skin lesions [No Venous Stasis] : no venous stasis [Affect] : the affect was normal [Oriented To Time, Place, And Person] : oriented to person, place, and time [No Xanthoma] : no  xanthoma was observed [No Anxiety] : not feeling anxious [Mood] : the mood was normal

## 2019-11-28 NOTE — REVIEW OF SYSTEMS
[Shortness Of Breath] : shortness of breath [Dizziness] : dizziness [Anxiety] : anxiety [Negative] : Heme/Lymph

## 2019-12-02 ENCOUNTER — APPOINTMENT (OUTPATIENT)
Dept: NEUROLOGY | Facility: CLINIC | Age: 54
End: 2019-12-02
Payer: MEDICAID

## 2019-12-02 VITALS
WEIGHT: 146 LBS | HEIGHT: 66 IN | SYSTOLIC BLOOD PRESSURE: 122 MMHG | DIASTOLIC BLOOD PRESSURE: 77 MMHG | HEART RATE: 76 BPM | TEMPERATURE: 98.3 F | OXYGEN SATURATION: 99 % | BODY MASS INDEX: 23.46 KG/M2

## 2019-12-02 DIAGNOSIS — R20.2 PARESTHESIA OF SKIN: ICD-10-CM

## 2019-12-02 DIAGNOSIS — F41.0 PANIC DISORDER [EPISODIC PAROXYSMAL ANXIETY]: ICD-10-CM

## 2019-12-02 PROCEDURE — 99204 OFFICE O/P NEW MOD 45 MIN: CPT

## 2019-12-02 RX ORDER — LORAZEPAM 0.5 MG/1
0.5 TABLET ORAL AS DIRECTED
Qty: 20 | Refills: 0 | Status: ACTIVE | COMMUNITY
Start: 2019-12-02 | End: 1900-01-01

## 2019-12-02 NOTE — PHYSICAL EXAM
[FreeTextEntry1] : Constitutional: no apparent distress\par Psychiatric: normal affect, euthyhmic, alert and oriented x 3\par HEENT: moist mucous membranes, oropharynx clear\par Neck: supple, no lymphadenopathy\par Respiratory: clear to auscultation bilaterally, no crackles or wheezing\par Cardiovascular: regular rate and rhythm, normal S1/S2, no murmurs, no edema\par GI: soft, non-tender, no distended, bowel sounds present; no hepatosplenomegaly on palpation\par Musculoskeletal: extremities warm, well-perfused, normal range of motion\par Skin: no rashes, bruises, or lesions\par \par Neurologic Exam:\par Mental Status: awake and alert, oriented to time, place, and person, attention intact, memory intact, speech fluent and prosodic with no paraphasic errors, repetition intact, follows simple and complex commands, normal fund of knowledge\par Cranial Nerves: I: deferred; II: pupils equal round and reactive, visual fields full to confrontation, discs sharp bilaterally; III, IV, VI: extraocular movements full with no nystagmus; V: facial sensation intact and symmetric; VII: facial power symmetric; VIII: hearing intact to finger rub; IX/X: palate elevates symmetrically, no dysarthria; XI: shoulder shrug symmetric; XII: tongue protrudes midline\par Motor: normal bulk and tone, no orbiting or pronator drift, power 5/5 to confrontation throughout including shoulder abduction, elbow flexion and extension, wrist flexion and extension, hip flexion, knee flexion and extension, plantarflexion, and dorsiflexion, no abnormal movements\par Sensation: intact to light touch in distal upper and lower extremities bilaterally\par Coordination: finger-nose-finger intact bilaterally\par Reflexes: 3+ biceps, triceps, brachioradialis, patella.  Toes downgoing bilaterally, no clonus\par Gait: narrow base, normal stance and stride, normal arm swing

## 2019-12-02 NOTE — DATA REVIEWED
[de-identified] : Echo 11/25/2019, report reviewed, unremarkable\par EKG 11/25/2019, NSR no TW changes\par

## 2019-12-02 NOTE — HISTORY OF PRESENT ILLNESS
[FreeTextEntry1] : 54-year-old man with no significant past medical history who presents for evaluation of episodes of heart palpitations and left arm tingling.\par \par He reports that over the past several months he has had several episodes of feeling like his heart is racing, followed by tingling in his left arm, then leg, then face/head.  During the episodes he feels like he is going to pass out but he has not lost consciousness.  Feels very anxious during the episodes.  He is usually able to stop them by convincing himself to relax and drinking water.  Symptoms resolve completely within an hour.  Currently wearing a 14 day Holter monitor.\par \par Mood has been low over the past several months.  He is not sleeping well and reports decreased interest in his usual activities.  No change in concentration, energy level, or appetite.  Often feels worried.\par \par In addition to the episodes described above, he has had intermittent bilateral upper and lower extremity numbness/heaviness for several years that he was told are due to problems in his neck and back.  This is most notable at night and makes it difficult for him to sleep.

## 2019-12-02 NOTE — DISCUSSION/SUMMARY
[FreeTextEntry1] : 54-year-old man with recent episodes consistent with panic attacks in the setting of poor mood and anxiety.  \par \par Agree with cardiac evaluation to rule out arrhythmia or other cardiac cause however I suspect panic attack is the most likely diagnosis.  Discussed this at length with the patient as well as options for treatment, PRN anxiolytic +/- daily antidepressant/antianxiety medication +/- talk therapy.  We agree to start with PRN anxiolytic (Ativan 0.5 mg when he feels an episode starting) and reassess need for chronic medication/therapy at next visit.  Side effects discussed including risk of dependence.\par  \par He also has chronic paresthesias of both the upper and lower extremities with both upper and lower extremity hyperreflexia on exam, without weakness.  Will obtain MRI C and L spine to assess degree of spinal stenosis, likely no indication for surgical intervention at this time as he has full strength throughout.\par \par RTC 2 months.\par

## 2019-12-05 ENCOUNTER — OTHER (OUTPATIENT)
Age: 54
End: 2019-12-05

## 2019-12-26 ENCOUNTER — APPOINTMENT (OUTPATIENT)
Dept: HEART AND VASCULAR | Facility: CLINIC | Age: 54
End: 2019-12-26
Payer: MEDICAID

## 2019-12-26 PROCEDURE — 93000 ELECTROCARDIOGRAM COMPLETE: CPT

## 2019-12-26 PROCEDURE — 99214 OFFICE O/P EST MOD 30 MIN: CPT

## 2020-01-21 ENCOUNTER — APPOINTMENT (OUTPATIENT)
Dept: INTERNAL MEDICINE | Facility: CLINIC | Age: 55
End: 2020-01-21

## 2020-01-21 ENCOUNTER — APPOINTMENT (OUTPATIENT)
Dept: MRI IMAGING | Facility: HOSPITAL | Age: 55
End: 2020-01-21

## 2020-06-29 ENCOUNTER — APPOINTMENT (OUTPATIENT)
Dept: GASTROENTEROLOGY | Facility: CLINIC | Age: 55
End: 2020-06-29

## 2021-12-17 ENCOUNTER — EMERGENCY (EMERGENCY)
Facility: HOSPITAL | Age: 56
LOS: 1 days | Discharge: HOME | End: 2021-12-17
Attending: EMERGENCY MEDICINE
Payer: MEDICAID

## 2021-12-17 VITALS
SYSTOLIC BLOOD PRESSURE: 133 MMHG | HEIGHT: 68 IN | WEIGHT: 143.96 LBS | HEART RATE: 85 BPM | OXYGEN SATURATION: 100 % | RESPIRATION RATE: 18 BRPM | DIASTOLIC BLOOD PRESSURE: 83 MMHG | TEMPERATURE: 98 F

## 2021-12-17 VITALS
SYSTOLIC BLOOD PRESSURE: 124 MMHG | HEART RATE: 82 BPM | DIASTOLIC BLOOD PRESSURE: 82 MMHG | RESPIRATION RATE: 18 BRPM | OXYGEN SATURATION: 98 % | TEMPERATURE: 98 F

## 2021-12-17 LAB
ALBUMIN SERPL ELPH-MCNC: 4.6 G/DL — SIGNIFICANT CHANGE UP (ref 3.3–5)
ALP SERPL-CCNC: 55 U/L — SIGNIFICANT CHANGE UP (ref 40–120)
ALT FLD-CCNC: 18 U/L — SIGNIFICANT CHANGE UP (ref 10–45)
ANION GAP SERPL CALC-SCNC: 11 MMOL/L — SIGNIFICANT CHANGE UP (ref 5–17)
APPEARANCE UR: CLEAR — SIGNIFICANT CHANGE UP
AST SERPL-CCNC: 19 U/L — SIGNIFICANT CHANGE UP (ref 10–40)
BASOPHILS # BLD AUTO: 0.12 K/UL — SIGNIFICANT CHANGE UP (ref 0–0.2)
BASOPHILS NFR BLD AUTO: 1.6 % — SIGNIFICANT CHANGE UP (ref 0–2)
BILIRUB SERPL-MCNC: 0.6 MG/DL — SIGNIFICANT CHANGE UP (ref 0.2–1.2)
BILIRUB UR-MCNC: NEGATIVE — SIGNIFICANT CHANGE UP
BUN SERPL-MCNC: 10 MG/DL — SIGNIFICANT CHANGE UP (ref 7–23)
CALCIUM SERPL-MCNC: 9.3 MG/DL — SIGNIFICANT CHANGE UP (ref 8.4–10.5)
CHLORIDE SERPL-SCNC: 101 MMOL/L — SIGNIFICANT CHANGE UP (ref 96–108)
CO2 SERPL-SCNC: 24 MMOL/L — SIGNIFICANT CHANGE UP (ref 22–31)
COLOR SPEC: COLORLESS — SIGNIFICANT CHANGE UP
CREAT SERPL-MCNC: 0.94 MG/DL — SIGNIFICANT CHANGE UP (ref 0.5–1.3)
DIFF PNL FLD: NEGATIVE — SIGNIFICANT CHANGE UP
EOSINOPHIL # BLD AUTO: 0.24 K/UL — SIGNIFICANT CHANGE UP (ref 0–0.5)
EOSINOPHIL NFR BLD AUTO: 3.2 % — SIGNIFICANT CHANGE UP (ref 0–6)
GLUCOSE SERPL-MCNC: 97 MG/DL — SIGNIFICANT CHANGE UP (ref 70–99)
GLUCOSE UR QL: NEGATIVE — SIGNIFICANT CHANGE UP
HCT VFR BLD CALC: 42 % — SIGNIFICANT CHANGE UP (ref 39–50)
HGB BLD-MCNC: 14.2 G/DL — SIGNIFICANT CHANGE UP (ref 13–17)
IMM GRANULOCYTES NFR BLD AUTO: 0.4 % — SIGNIFICANT CHANGE UP (ref 0–1.5)
KETONES UR-MCNC: SIGNIFICANT CHANGE UP
LEUKOCYTE ESTERASE UR-ACNC: NEGATIVE — SIGNIFICANT CHANGE UP
LIDOCAIN IGE QN: 48 U/L — SIGNIFICANT CHANGE UP (ref 7–60)
LYMPHOCYTES # BLD AUTO: 2.8 K/UL — SIGNIFICANT CHANGE UP (ref 1–3.3)
LYMPHOCYTES # BLD AUTO: 37.6 % — SIGNIFICANT CHANGE UP (ref 13–44)
MCHC RBC-ENTMCNC: 28.5 PG — SIGNIFICANT CHANGE UP (ref 27–34)
MCHC RBC-ENTMCNC: 33.8 GM/DL — SIGNIFICANT CHANGE UP (ref 32–36)
MCV RBC AUTO: 84.3 FL — SIGNIFICANT CHANGE UP (ref 80–100)
MONOCYTES # BLD AUTO: 0.58 K/UL — SIGNIFICANT CHANGE UP (ref 0–0.9)
MONOCYTES NFR BLD AUTO: 7.8 % — SIGNIFICANT CHANGE UP (ref 2–14)
NEUTROPHILS # BLD AUTO: 3.67 K/UL — SIGNIFICANT CHANGE UP (ref 1.8–7.4)
NEUTROPHILS NFR BLD AUTO: 49.4 % — SIGNIFICANT CHANGE UP (ref 43–77)
NITRITE UR-MCNC: NEGATIVE — SIGNIFICANT CHANGE UP
NRBC # BLD: 0 /100 WBCS — SIGNIFICANT CHANGE UP (ref 0–0)
PH UR: 6.5 — SIGNIFICANT CHANGE UP (ref 5–8)
PLATELET # BLD AUTO: 280 K/UL — SIGNIFICANT CHANGE UP (ref 150–400)
POTASSIUM SERPL-MCNC: 4.5 MMOL/L — SIGNIFICANT CHANGE UP (ref 3.5–5.3)
POTASSIUM SERPL-SCNC: 4.5 MMOL/L — SIGNIFICANT CHANGE UP (ref 3.5–5.3)
PROT SERPL-MCNC: 7.6 G/DL — SIGNIFICANT CHANGE UP (ref 6–8.3)
PROT UR-MCNC: NEGATIVE — SIGNIFICANT CHANGE UP
RBC # BLD: 4.98 M/UL — SIGNIFICANT CHANGE UP (ref 4.2–5.8)
RBC # FLD: 12.7 % — SIGNIFICANT CHANGE UP (ref 10.3–14.5)
SODIUM SERPL-SCNC: 136 MMOL/L — SIGNIFICANT CHANGE UP (ref 135–145)
SP GR SPEC: 1.02 — SIGNIFICANT CHANGE UP (ref 1.01–1.02)
UROBILINOGEN FLD QL: NEGATIVE — SIGNIFICANT CHANGE UP
WBC # BLD: 7.44 K/UL — SIGNIFICANT CHANGE UP (ref 3.8–10.5)
WBC # FLD AUTO: 7.44 K/UL — SIGNIFICANT CHANGE UP (ref 3.8–10.5)

## 2021-12-17 PROCEDURE — 85025 COMPLETE CBC W/AUTO DIFF WBC: CPT

## 2021-12-17 PROCEDURE — 93010 ELECTROCARDIOGRAM REPORT: CPT

## 2021-12-17 PROCEDURE — 74177 CT ABD & PELVIS W/CONTRAST: CPT | Mod: MD

## 2021-12-17 PROCEDURE — 80053 COMPREHEN METABOLIC PANEL: CPT

## 2021-12-17 PROCEDURE — 93005 ELECTROCARDIOGRAM TRACING: CPT

## 2021-12-17 PROCEDURE — 83690 ASSAY OF LIPASE: CPT

## 2021-12-17 PROCEDURE — 81003 URINALYSIS AUTO W/O SCOPE: CPT

## 2021-12-17 PROCEDURE — 99285 EMERGENCY DEPT VISIT HI MDM: CPT | Mod: 25

## 2021-12-17 PROCEDURE — 74177 CT ABD & PELVIS W/CONTRAST: CPT | Mod: 26,MD

## 2021-12-17 PROCEDURE — 87086 URINE CULTURE/COLONY COUNT: CPT

## 2021-12-17 PROCEDURE — 99284 EMERGENCY DEPT VISIT MOD MDM: CPT | Mod: 25

## 2021-12-17 PROCEDURE — 96374 THER/PROPH/DIAG INJ IV PUSH: CPT | Mod: XU

## 2021-12-17 RX ORDER — FAMOTIDINE 10 MG/ML
20 INJECTION INTRAVENOUS ONCE
Refills: 0 | Status: COMPLETED | OUTPATIENT
Start: 2021-12-17 | End: 2021-12-17

## 2021-12-17 RX ADMIN — Medication 30 MILLILITER(S): at 11:05

## 2021-12-17 RX ADMIN — FAMOTIDINE 20 MILLIGRAM(S): 10 INJECTION INTRAVENOUS at 11:04

## 2021-12-17 NOTE — ED PROVIDER NOTE - PHYSICAL EXAMINATION
Gen: WDWN, NAD  HEENT: EOMI, no nasal discharge, mucous membranes moist  CV: 2+ radial pulses b/l  Resp: CTAB, no W/R/R, no accessory muscle use, no increased work of breathing  GI: Abdomen soft non-distended, NTTP  : no CVA TTP b/l  MSK: No open wounds, no bruising, no LE edema  Neuro: A&Ox4, following commands, moving all four extremities spontaneously  Psych: appropriate mood

## 2021-12-17 NOTE — ED PROVIDER NOTE - NSFOLLOWUPINSTRUCTIONS_ED_ALL_ED_FT
--take tylenol as needed for pain. Take tylenol 650 mg every 6 hours. take prescribed PPI/acid pill.     --today, the lab tests we did include basic blood and urine studies, the imaging tests we did include CT abdomen/pelvis. results significant for *** (no abnormalities). we have included these test results in your paperwork. please take them to your follow-up appointment    --given that you were in the ED today, we recommend a followup visit with your general doctor (primary care doctor) within 5 days.     --your diagnosis is: abdominal pain    --return to the ED if your current symptoms worsen, if your pain doesn't resolve with tylenol, if fevers, if persistent vomiting. --take tylenol as needed for pain. Take tylenol 650 mg every 6 hours. take prescribed PPI/acid pill.     --today, the lab tests we did include basic blood and urine studies, the imaging tests we did include CT abdomen/pelvis. results significant for mucus in lung. we have included these test results in your paperwork. please take them to your follow-up appointment    --given that you were in the ED today, we recommend a followup visit with your GI doctor within 5 days.     --your diagnosis is: abdominal pain    --return to the ED if your current symptoms worsen, if your pain doesn't resolve with tylenol, if fevers, if persistent vomiting.

## 2021-12-17 NOTE — ED PROVIDER NOTE - NSICDXPASTMEDICALHX_GEN_ALL_CORE_FT
PAST MEDICAL HISTORY:  COVID-19 vaccine series completed J&J    No pertinent past medical history

## 2021-12-17 NOTE — ED PROVIDER NOTE - NS ED ROS FT
Gen: Denies fevers  CV: Denies chest pain  Resp: Denies SOB, cough  GI: + epigastric pain. Denies nausea, vomiting  Msk: Denies extremity pain  : Denies dysuria, flank pain  all other ROS negative unless indicated in HPI

## 2021-12-17 NOTE — ED ADULT TRIAGE NOTE - INTERNATIONAL TRAVEL
Patients joce Torres called refill line 1/16/18 at 4:30PM for patients    CONCERTA 36 MG    Pharmacy: [facility]     No

## 2021-12-17 NOTE — ED PROVIDER NOTE - PATIENT PORTAL LINK FT
You can access the FollowMyHealth Patient Portal offered by [facility] by registering at the following website: [email or web address]. By joining [facility]’s FollowMyHealth portal, you will also be able to view your health information using other applications (apps) compatible with our system.

## 2021-12-17 NOTE — ED PROVIDER NOTE - OBJECTIVE STATEMENT
55YO M no pmhx p/w abd pain. epigastric. x1m. worsening today. associated with nausea. pain worse after eating, associated with abdominal distention. denies abdominal surgical hx, BRBPR, endorses mild constipation. had endoscopy 2w prior, negative for abnormalities, celiac and hpylori testing negative. is taking PPI. denies dysuria, flank pain, hx of kidney stones or gallstones.

## 2021-12-17 NOTE — ED ADULT NURSE NOTE - OBJECTIVE STATEMENT
Patient A&Ox4, respirations even and unlabored, skin warm and ry good for color, ambulatory at baseline, abdomen non-distended. Patient arrives with complaints of intermittent epigastric pain x 1 month accompanied by nausea. Patient reports was sent to ED by provider for CT. Right Ac 20g IV placed, Provider currently at patient bedside. Patient denies chest pain, SOB, fevers or chills.

## 2021-12-18 LAB
CULTURE RESULTS: NO GROWTH — SIGNIFICANT CHANGE UP
SPECIMEN SOURCE: SIGNIFICANT CHANGE UP

## 2022-08-11 NOTE — ED PROVIDER NOTE - NS ED MD EM SELECTION
08765 Exp Problem Focused - Mod. Complex Composite Graft Text: The defect edges were debeveled with a #15 scalpel blade.  Given the location of the defect, shape of the defect, the proximity to free margins and the fact the defect was full thickness a composite graft was deemed most appropriate.  The defect was outline and then transferred to the donor site.  A full thickness graft was then excised from the donor site. The graft was then placed in the primary defect, oriented appropriately and then sutured into place.  The secondary defect was then repaired using a primary closure.

## 2022-10-07 NOTE — ED PROVIDER NOTE - ATTENDING CONTRIBUTION TO CARE
Instructions: This plan will send the code FBSE to the PM system.  DO NOT or CHANGE the price. Price (Do Not Change): 0.00 Detail Level: Simple Private Physician Tito, pcp, [place]  56y male pmh No habits, Meds, Allergy, No abd surg, PT comes to ed c/o abd pain onset one past month. Upper epigastric, burning alternating vague pain. Waxes and wanes in intensity worse w eating. Nausea without vomiting, +anorexia, no diarrhea, fever, chills. cough. No ill contacts. Referred to ed by pmd for CT and eval. PE WDWN male looking mildly uncomfortable heent neck supple chest clear anterior & posterior cv no rubs, gallops or murmurs abd soft +bs no mass guarding neuro no focal defects  Nba Thibodeaux MD, Facep

## 2024-05-19 ENCOUNTER — EMERGENCY (EMERGENCY)
Facility: HOSPITAL | Age: 59
LOS: 1 days | Discharge: HOME | End: 2024-05-19
Attending: EMERGENCY MEDICINE
Payer: MEDICAID

## 2024-05-19 VITALS
RESPIRATION RATE: 18 BRPM | DIASTOLIC BLOOD PRESSURE: 77 MMHG | TEMPERATURE: 98 F | SYSTOLIC BLOOD PRESSURE: 112 MMHG | OXYGEN SATURATION: 98 % | HEART RATE: 65 BPM

## 2024-05-19 VITALS
OXYGEN SATURATION: 99 % | HEART RATE: 74 BPM | RESPIRATION RATE: 18 BRPM | WEIGHT: 149.91 LBS | DIASTOLIC BLOOD PRESSURE: 91 MMHG | SYSTOLIC BLOOD PRESSURE: 136 MMHG | TEMPERATURE: 98 F | HEIGHT: 68 IN

## 2024-05-19 PROCEDURE — 99283 EMERGENCY DEPT VISIT LOW MDM: CPT | Mod: 25

## 2024-05-19 PROCEDURE — 72100 X-RAY EXAM L-S SPINE 2/3 VWS: CPT

## 2024-05-19 PROCEDURE — 72100 X-RAY EXAM L-S SPINE 2/3 VWS: CPT | Mod: 26

## 2024-05-19 PROCEDURE — 96372 THER/PROPH/DIAG INJ SC/IM: CPT

## 2024-05-19 PROCEDURE — 99284 EMERGENCY DEPT VISIT MOD MDM: CPT

## 2024-05-19 RX ORDER — DIAZEPAM 5 MG
1 TABLET ORAL
Qty: 9 | Refills: 0
Start: 2024-05-19 | End: 2024-05-21

## 2024-05-19 RX ORDER — DIAZEPAM 5 MG
5 TABLET ORAL ONCE
Refills: 0 | Status: DISCONTINUED | OUTPATIENT
Start: 2024-05-19 | End: 2024-05-19

## 2024-05-19 RX ORDER — LIDOCAINE 4 G/100G
1 CREAM TOPICAL ONCE
Refills: 0 | Status: COMPLETED | OUTPATIENT
Start: 2024-05-19 | End: 2024-05-19

## 2024-05-19 RX ORDER — ACETAMINOPHEN 500 MG
975 TABLET ORAL ONCE
Refills: 0 | Status: COMPLETED | OUTPATIENT
Start: 2024-05-19 | End: 2024-05-19

## 2024-05-19 RX ORDER — KETOROLAC TROMETHAMINE 30 MG/ML
30 SYRINGE (ML) INJECTION ONCE
Refills: 0 | Status: DISCONTINUED | OUTPATIENT
Start: 2024-05-19 | End: 2024-05-19

## 2024-05-19 RX ADMIN — Medication 975 MILLIGRAM(S): at 17:59

## 2024-05-19 RX ADMIN — LIDOCAINE 1 PATCH: 4 CREAM TOPICAL at 18:00

## 2024-05-19 RX ADMIN — Medication 30 MILLIGRAM(S): at 18:56

## 2024-05-19 RX ADMIN — Medication 5 MILLIGRAM(S): at 18:00

## 2024-05-19 NOTE — ED PROVIDER NOTE - PHYSICAL EXAMINATION
NAD. VSS. Afebrile. Neck supple. Lungs clear. No C/T tender. +Generalized low lumbar tender. No CVA tender. ABD soft, non tender. No hip tender. Neuro- intact.

## 2024-05-19 NOTE — ED ADULT NURSE REASSESSMENT NOTE - NS ED NURSE REASSESS COMMENT FT1
Report received from Charmaine SINGLETON. Pt A&Ox4, in no acute distress at time. Family remains at bedside. Patient safety maintained, bed is in lowest position, wheels locked, and side rails raised.

## 2024-05-19 NOTE — ED PROVIDER NOTE - NSFOLLOWUPINSTRUCTIONS_ED_ALL_ED_FT
Please see the information of Back pain.    No heavy lifting or strain your back.    Warm compression to pain area.    Take Ibuprofen (600mg every 8hours with food) or Tylenol (2 tablets of 500mg every 8hours) as needed for pain.    Valium as prescribed for back stiffness with a caution of drowsiness: NO DRIVE or DRINK while taking Valium.     Follow up with your pain management DrLouis as scheduled Tuesday.    Return for any concerns, fever, numbness, weakness, urinary /bowel problems, or    Valium as prescribed for back stiffness with a caution of drowsiness: NO DRIVE or DRINK while taking Valium.     Take Tylenol (2 tablets of 500mg every 8hours) as needed for pain.    Follow up with spine center ([phone]) for reevaluation, call tomorrow for appointment. Also ED referral coordinator will call you for the arrangement.    Return for any concerns, fever, numbness, weakness, urinary /bowel problems, or Please see the information of Back pain.    No heavy lifting or strain your back.    Warm compression to pain area.    Take Ibuprofen (600mg every 8hours with food) or Tylenol (2 tablets of 500mg every 8hours) as needed for pain.    Valium as prescribed for back stiffness with a caution of drowsiness: NO DRIVE or DRINK while taking Valium.     Return for any concerns, fever, numbness, weakness, urinary /bowel problems, or    Valium as prescribed for back stiffness with a caution of drowsiness: NO DRIVE or DRINK while taking Valium.     Take Tylenol (2 tablets of 500mg every 8hours) as needed for pain.    Follow up with spine center ([phone]) for reevaluation, call tomorrow for appointment. Also ED referral coordinator will call you for the arrangement.    Return for any concerns, fever, numbness, weakness, urinary /bowel problems, or

## 2024-05-19 NOTE — ED ADULT NURSE NOTE - OBJECTIVE STATEMENT
59y M PMH chronic back pain presents to the ED c/o low back pain. Pt reports he was doing work in the yard 3-4wks ago when he was doing a 'pushing motion' and his back has been hurting since. Pt reports taking OTC pain meds with minimal relief. Pt denies chest pain, sob, nvd, abd pain, fevers, chills, urinary symptoms.

## 2024-05-19 NOTE — ED PROVIDER NOTE - OBJECTIVE STATEMENT
58yo male pt, no PMHx presents to ED with low back pain. Reports he was doing work in the yard 3-4wks ago when he was doing a 'pushing motion' and his back has been hurting since. Denies fall or other injuries. Noticed worsening pain with movement. He's not f/ued with MD and been on Ibuprofen without improvement. Denies headache, dizziness, or neck pain. Denies radiating pain, sensory changes or weakness to extremities. Denies CP/SOB/ABD pain or N/V/D. Denies urinary or bowel problems.

## 2024-05-19 NOTE — ED PROVIDER NOTE - ATTENDING APP SHARED VISIT CONTRIBUTION OF CARE
I, Amado Caal MD, Emergency Medicine Attending Physician, personally saw and examined the patient and I personally made/approve the management plan and take responsibility for the patient management.    MDM: 59-year-old male who is otherwise healthy who presents with low back pain for the last 2 to 4 weeks which occurred after pushing while doing work in the yard.  Denies any radiation of pain down the lower extremities.    ROS: denies fevers, chills, weight loss, pain worse at night, urinary retention, incontinence of bowel or bladder, saddle anesthesia, lower extremity weakness or numbness, and no recent trauma or falls.    On examination, patient with stable vitals, uncomfortable appearing. Cardiac examination RRR, lungs CTAB.  Abdomen is soft and nontender, no CVA tenderness.  L-SPINE: There is no erythema, deformity, swelling. Palpation without midline spinal tenderness or step off.  (+) Tenderness to bilateral paraspinal musculature.  L2-S2 with normal 5/5 motor strength and normal sensation.  No hyperreflexia, hyperreflexia or clonus..    Patient with atraumatic back pain with no red flags or neuro deficits. Differential includes but is not limited to lumbar radiculopathy, spondylosis, disc herniation, degenerative disc disease.  However, not consistent with presentation for epidural hematoma, or infectious etiology including epidural abscess, osteomyelitis/discitis, and no evidence of cauda equina or cord compression.  Due to age, will obtain screening x-ray of lumbar spine.  Will provide pain control and reassess.    My independent interpretation of the lumbar spine x-ray images shows no acute fracture or traumatic malalignment.   More details to be seen in the official radiologist read.    At 2005, patient was reassessed and reports improved symptoms. Repeat examination shows no midline spinal tenderness to palpation, improved ROM. L2-S2 motor and sensory examination normal. Patient able to go from laying to sitting to standing position and ambulate without ataxia or assistance.  Stable for discharge with close follow-up and strict return precautions. Discussed the indications and side-effects of applicable medications.  Informed patient of all diagnostic test results including imaging. The patient has been informed of all concerning signs and symptoms to return to Emergency Department, the necessity to follow up with PMD within 2-3 days and spine specialist within 4 to 5 days was explained, or to return to the ED if unable to follow-up appropriately, and the patient reports understanding of above with capacity and insight.

## 2024-05-19 NOTE — ED PROVIDER NOTE - PATIENT PORTAL LINK FT
You can access the FollowMyHealth Patient Portal offered by [facility] by registering at the following website: [email or web address]. By joining [facility]’s FollowMyHealth portal, you will also be able to view your health information using other applications (apps) compatible with our system.

## 2024-05-20 PROBLEM — Z92.29 PERSONAL HISTORY OF OTHER DRUG THERAPY: Chronic | Status: ACTIVE | Noted: 2021-12-17

## 2024-09-18 ENCOUNTER — APPOINTMENT (OUTPATIENT)
Dept: OPHTHALMOLOGY | Facility: CLINIC | Age: 59
End: 2024-09-18
Payer: MEDICAID

## 2024-09-18 ENCOUNTER — NON-APPOINTMENT (OUTPATIENT)
Age: 59
End: 2024-09-18

## 2024-09-18 PROCEDURE — 92004 COMPRE OPH EXAM NEW PT 1/>: CPT

## 2024-10-14 ENCOUNTER — NON-APPOINTMENT (OUTPATIENT)
Age: 59
End: 2024-10-14

## 2024-10-14 ENCOUNTER — APPOINTMENT (OUTPATIENT)
Dept: OPHTHALMOLOGY | Facility: CLINIC | Age: 59
End: 2024-10-14
Payer: MEDICAID

## 2024-10-14 PROCEDURE — 92012 INTRM OPH EXAM EST PATIENT: CPT

## 2025-06-09 NOTE — ED PROVIDER NOTE - QRS
[Medication Risks Reviewed] : Medication risks reviewed [de-identified] : We discussed formal PT, a home exercise program, ice therapy and the role of NSAIDS for the pain. These modalities will improve the patient's functionality in their activities of daily life.  Risks, benefits and contraindications were discussed.  The patient will follow up in 6 weeks or sooner as needed.  90ms